# Patient Record
Sex: FEMALE | Race: WHITE | Employment: OTHER | URBAN - METROPOLITAN AREA
[De-identification: names, ages, dates, MRNs, and addresses within clinical notes are randomized per-mention and may not be internally consistent; named-entity substitution may affect disease eponyms.]

---

## 2023-11-06 ENCOUNTER — HOSPITAL ENCOUNTER (OUTPATIENT)
Age: 78
Discharge: HOME | End: 2023-11-08

## 2023-11-06 PROCEDURE — 88305 TISSUE EXAM BY PATHOLOGIST: CPT

## 2023-11-08 LAB — SURGICAL PATHOLOGY REPORT: NORMAL

## 2023-11-13 ENCOUNTER — OFFICE VISIT (OUTPATIENT)
Dept: ENT CLINIC | Age: 78
End: 2023-11-13
Payer: MEDICARE

## 2023-11-13 ENCOUNTER — PROCEDURE VISIT (OUTPATIENT)
Dept: AUDIOLOGY | Age: 78
End: 2023-11-13
Payer: MEDICARE

## 2023-11-13 VITALS
RESPIRATION RATE: 12 BRPM | HEART RATE: 71 BPM | HEIGHT: 63 IN | DIASTOLIC BLOOD PRESSURE: 84 MMHG | SYSTOLIC BLOOD PRESSURE: 169 MMHG | WEIGHT: 190 LBS | BODY MASS INDEX: 33.66 KG/M2

## 2023-11-13 DIAGNOSIS — H92.03 OTALGIA OF BOTH EARS: Primary | ICD-10-CM

## 2023-11-13 DIAGNOSIS — H61.21 IMPACTED CERUMEN OF RIGHT EAR: Primary | ICD-10-CM

## 2023-11-13 PROCEDURE — 69210 REMOVE IMPACTED EAR WAX UNI: CPT

## 2023-11-13 PROCEDURE — G8417 CALC BMI ABV UP PARAM F/U: HCPCS

## 2023-11-13 PROCEDURE — 92567 TYMPANOMETRY: CPT | Performed by: AUDIOLOGIST

## 2023-11-13 PROCEDURE — G8484 FLU IMMUNIZE NO ADMIN: HCPCS

## 2023-11-13 PROCEDURE — 1090F PRES/ABSN URINE INCON ASSESS: CPT

## 2023-11-13 PROCEDURE — 1123F ACP DISCUSS/DSCN MKR DOCD: CPT | Performed by: OTOLARYNGOLOGY

## 2023-11-13 PROCEDURE — 99213 OFFICE O/P EST LOW 20 MIN: CPT | Performed by: OTOLARYNGOLOGY

## 2023-11-13 PROCEDURE — 1036F TOBACCO NON-USER: CPT

## 2023-11-13 PROCEDURE — G8400 PT W/DXA NO RESULTS DOC: HCPCS

## 2023-11-13 PROCEDURE — G8427 DOCREV CUR MEDS BY ELIG CLIN: HCPCS

## 2023-11-13 ASSESSMENT — ENCOUNTER SYMPTOMS
WHEEZING: 0
TROUBLE SWALLOWING: 0
CHOKING: 0
COLOR CHANGE: 0
SINUS PRESSURE: 0
COUGH: 0
STRIDOR: 0
VOICE CHANGE: 0
RHINORRHEA: 0
SORE THROAT: 0
SINUS PAIN: 0
GASTROINTESTINAL NEGATIVE: 1

## 2023-11-13 ASSESSMENT — VISUAL ACUITY: OU: 1

## 2023-11-13 NOTE — PROGRESS NOTES
[facility]  Dr. Segundo Marsh. MARY King Ms.Ed. New Consult       Patient Name:  James Calzada  :  1945     CHIEF C/O:    Chief Complaint   Patient presents with    New Patient     New patient otalgia, bilaterally patient states it's like being on a plane, with the pressure build up and she cannot clear her ears       HISTORY OBTAINED FROM:  patient    HISTORY OF PRESENT ILLNESS:       Vance Ford is a 78y.o. year old female, here today for bilateral ear problems. Reports right ear pressure and fullness which comes and goes but is now bilateral. Talking makes it worse. Uses hearing aids. Feels both ears are \"blocked\". No h/o recurrent ear infections or prior otologic surgeries. Denies allergy symptoms including congestion or runny nose. Has tinnitus. Denies vertigo or ear drainage. Occasionally uses Flonase. H/o tonsillectomy. Past Medical History:   Diagnosis Date    Cancer ([address])     right breast    Diabetes mellitus ([address])     Hyperlipidemia      Past Surgical History:   Procedure Laterality Date    MASTECTOMY, BILATERAL Bilateral     TISSUE EXPANDER  REMOVAL W/ REPLACEMENT OF IMPLANT      TONSILLECTOMY         Current Outpatient Medications:     RED YEAST RICE, by Does not apply route., Disp: , Rfl:     Omega-3-acid Ethyl Esters (LOVAZA PO), Take  by mouth., Disp: , Rfl:     aspirin 81 MG EC tablet, Take 1 tablet by mouth daily, Disp: , Rfl:     Coenzyme Q10 (COQ10 PO), Take  by mouth., Disp: , Rfl:     Cholecalciferol (VITAMIN D PO), Take  by mouth., Disp: , Rfl:     meloxicam (MOBIC) 15 MG tablet, Take 1 tablet by mouth daily, Disp: 30 tablet, Rfl: 0  Doxycycline and Sulfa antibiotics  Social History     Tobacco Use    Smoking status: Never    Smokeless tobacco: Never   Substance Use Topics    Alcohol use: Yes     Comment: occasionally    Drug use: No     History reviewed. No pertinent family history.     Review of Systems   Constitutional:  Negative for activity change, fatigue and

## 2023-11-13 NOTE — PROGRESS NOTES
This patient was referred for tympanometric testing by Dr. Wendy Carpenter due to ear pain. Tympanometry revealed normal middle ear peak pressure and compliance, in the right ear and slight negative middle ear pressure (-129 daPa), in the left ear. The results were reviewed with the patient. Recommendations for follow up will be made pending physician consult.       Yasmine Monzon CCC-A  [address] [place], [place] [id number]   Electronically signed by Yasmine Monzon on 11/13/2023 at 4:03 PM

## 2023-11-14 ENCOUNTER — TELEPHONE (OUTPATIENT)
Dept: ENT CLINIC | Age: 78
End: 2023-11-14

## 2023-11-14 NOTE — TELEPHONE ENCOUNTER
Pt called complaining that her After Visit Summary information is incorrect. Pt stated that she has had her covid vaccinations and it was not on the AVS. Explained that it is the responsibility of the office that administers the vaccines or her PCP to document her vaccines. Pt also reported that her medications were incorrect so I attempted to review them with her and she kept interrupting just saying they were wrong. There was Meloxicam listed as a medication that was  in 2017 and it was explained that it was not showing that she was taking it. Pt yelled \"I don't give a Sh%$, it's wrong! \" After another attempt to explain her chart, Pt hung up.